# Patient Record
(demographics unavailable — no encounter records)

---

## 2025-04-28 NOTE — HISTORY OF PRESENT ILLNESS
[FreeTextEntry2] : Nicoeltte is a 16 year 5 month old female who was referred by her pediatrician for evaluation of excess sweating. She was diagnosed with hyperhidrosis at least a year ago. Over the last year, it has worsened. Excess sweating, even not when hot, in axilla areas and back. Constantly sweating. She saw derm who prescribed a topical medication - but it does not work.   Follows with Dr. Hernandez, peds GI at [facility] Vonda has been diagnosed with celiac disease (follows gluten free diet) and colitis (unclear which kind; takes mesalamine).   Bllood work on 11/11/24 showed normal: TSH 1.94 uIU/mL, total T4 7.8 ug/dL, CMP, A1c, lipid panel, CBC,   plan derm  [Regular Periods] : regular periods [FreeTextEntry1] : Menarche 10 yo; LMP 3/24/25, 2/27/25; current period is a few days late

## 2025-04-28 NOTE — PAST MEDICAL HISTORY
[At ___ Weeks Gestation] : at [unfilled] weeks gestation [Normal Vaginal Route] : by normal vaginal route [None] : there were no delivery complications [Age Appropriate] : age appropriate developmental milestones met [FreeTextEntry1] : avg weight